# Patient Record
Sex: MALE | Race: BLACK OR AFRICAN AMERICAN | NOT HISPANIC OR LATINO | URBAN - METROPOLITAN AREA
[De-identification: names, ages, dates, MRNs, and addresses within clinical notes are randomized per-mention and may not be internally consistent; named-entity substitution may affect disease eponyms.]

---

## 2024-03-28 ENCOUNTER — OFFICE (OUTPATIENT)
Dept: URBAN - METROPOLITAN AREA CLINIC 19 | Facility: CLINIC | Age: 49
End: 2024-03-28
Payer: COMMERCIAL

## 2024-03-28 VITALS
DIASTOLIC BLOOD PRESSURE: 79 MMHG | SYSTOLIC BLOOD PRESSURE: 131 MMHG | HEIGHT: 73 IN | WEIGHT: 315 LBS | HEART RATE: 61 BPM | OXYGEN SATURATION: 100 %

## 2024-03-28 DIAGNOSIS — Z79.01 LONG TERM (CURRENT) USE OF ANTICOAGULANTS: ICD-10-CM

## 2024-03-28 DIAGNOSIS — I50.9 HEART FAILURE, UNSPECIFIED: ICD-10-CM

## 2024-03-28 DIAGNOSIS — Z95.810 PRESENCE OF AUTOMATIC (IMPLANTABLE) CARDIAC DEFIBRILLATOR: ICD-10-CM

## 2024-03-28 DIAGNOSIS — I63.9 CEREBRAL INFARCTION, UNSPECIFIED: ICD-10-CM

## 2024-03-28 DIAGNOSIS — Z12.11 ENCOUNTER FOR SCREENING FOR MALIGNANT NEOPLASM OF COLON: ICD-10-CM

## 2024-03-28 DIAGNOSIS — E66.01 MORBID (SEVERE) OBESITY DUE TO EXCESS CALORIES: ICD-10-CM

## 2024-03-28 PROCEDURE — S0285 CNSLT BEFORE SCREEN COLONOSC: HCPCS

## 2024-03-28 RX ORDER — POLYETHYLENE GLYCOL 3350, SODIUM SULFATE, SODIUM CHLORIDE, POTASSIUM CHLORIDE, ASCORBIC ACID, SODIUM ASCORBATE 140-9-5.2G
KIT ORAL
Qty: 1 | Refills: 0 | Status: ACTIVE
Start: 2024-03-28

## 2024-03-28 NOTE — SERVICENOTES
Procedure and risks including but not limited to anesthesia, bleeding perforation, etc. were discussed with the patient in detail., 

MD Addendum: ISimi MD, independently interviewed and examined this patient and made modifications to the final HPI, exam, impression, and plan as initially scribed by Amy Mendoza NP.

## 2024-03-28 NOTE — SERVICEHPINOTES
49-year-old male here to discuss a colonoscopy.  He has never had a colonoscopy.  Denies any upper or lower GI symptoms.  Denies abdominal pain or abnormal weight loss.  Denies melena or hematochezia or hematemesis.  History of a stroke in 2022 and remains on Eliquis.  He does not have a neurologist.  History of congestive heart failure and remains on Entresto as well as spironolactone. He has a defibrillator which was placed in 2022.  He is followed by Dr. Trae Coleman. Reports last  echocardiogram had an EF of 28%. Not taking weight loss medications.